# Patient Record
Sex: MALE | Race: WHITE | Employment: OTHER
[De-identification: names, ages, dates, MRNs, and addresses within clinical notes are randomized per-mention and may not be internally consistent; named-entity substitution may affect disease eponyms.]

---

## 2021-04-23 ENCOUNTER — TRANSCRIBE ORDERS (OUTPATIENT)
Dept: OTHER | Age: 76
End: 2021-04-23

## 2021-04-23 DIAGNOSIS — C61 PROSTATE CANCER (H): Primary | ICD-10-CM

## 2021-04-27 NOTE — TELEPHONE ENCOUNTER
MEDICAL RECORDS REQUEST   [facility]  [facility] Clinic  [address] [place] [address]  PHONE: [phone]  Fax: [phone]        FUTURE VISIT INFORMATION                                                   Krystian Lewis, : 1945 scheduled for future visit at [facility]    APPOINTMENT INFORMATION:    Date: 21    Provider:  Carmelo    Reason for Visit/Diagnosis: prostate cancer    REFERRAL INFORMATION:    Referring provider:  Dr. Boles    Specialty: Oncology    Referring providers clinic:  [facility] contact number:      RECORDS REQUESTED FOR VISIT                                                     NOTES  STATUS/DETAILS   OFFICE NOTE from referring provider  yes   OFFICE NOTE from other specialist  yes   DISCHARGE SUMMARY from hospital  no   DISCHARGE REPORT from the ER  no   OPERATIVE REPORT  no   MEDICATION LIST  yes   LABS     URINALYSIS (UA)  yes   URINE CYTOLOGY  no   PROSTATE CANCER     BONE SCAN  no   MRI OF PROSTATE  no   PATHOLOGY REPORT & SLIDES  in process   PSA (LAB)  yes       Action . MJ   Action Taken Requested from PN  3. CT pelvis, PN  12.20 Ct ab pelvis, PN  Requested slides from  Case number [id number] 11.20       Action 5.6.21 12:43 PM ADRIANNA   Action Taken Rerequested imaging and path from PN again [phone]     Action 5..21 10:25 AM ADRIANNA   Action Taken Images in PACS  3.2.21 CT pelvis  12.8.20 CT abdomen pelvis, NM bone scan  9.17.19 US renal baldder  3.18.19 MR prostate     Action . MJ 11:38 AM   Action Taken No path has been received yet. Sent request to      Action 2021 JTV 3:21PM   Action Taken Pathology slides from Ginny Castillo received. Pathology slides were counted for and sent to Pathology lab to be reviewed.

## 2021-05-18 ENCOUNTER — TELEPHONE (OUTPATIENT)
Dept: UROLOGY | Facility: CLINIC | Age: 76
End: 2021-05-18

## 2021-05-18 NOTE — TELEPHONE ENCOUNTER
[facility] Call Center    Phone Message    May a detailed message be left on voicemail: yes     Reason for Call: Other: PT Krystian called for information about the specific types of radiation therapy available with Dr. Rhodes for his cancer diagnoses. Per PT also requested to be seen earlier than current 6/16/21 appt if possible. Pls call him to discuss.    Action Taken: Message routed to:  [facility]    Travel Screening: Not Applicable

## 2021-05-19 NOTE — TELEPHONE ENCOUNTER
Contacted patient and referred him to oncology regarding radiation therapy.  Jazz Aldana LPN  Urology Clinic Service   [facility]

## 2021-06-04 LAB — COPATH REPORT: NORMAL

## 2021-06-04 PROCEDURE — 999N001032 HC STATISTIC REVIEW OUTSIDE SLIDES TC 88321: Performed by: UROLOGY

## 2021-06-04 PROCEDURE — 88321 CONSLTJ&REPRT SLD PREP ELSWR: CPT | Performed by: PATHOLOGY

## 2021-06-16 ENCOUNTER — PRE VISIT (OUTPATIENT)
Dept: UROLOGY | Facility: CLINIC | Age: 76
End: 2021-06-16